# Patient Record
Sex: FEMALE | Race: WHITE | Employment: FULL TIME | URBAN - METROPOLITAN AREA
[De-identification: names, ages, dates, MRNs, and addresses within clinical notes are randomized per-mention and may not be internally consistent; named-entity substitution may affect disease eponyms.]

---

## 2020-10-05 ENCOUNTER — HOSPITAL ENCOUNTER (OUTPATIENT)
Dept: PHYSICAL THERAPY | Age: 25
Discharge: HOME | End: 2020-10-05
Payer: COMMERCIAL

## 2020-10-05 PROCEDURE — 97140 MANUAL THERAPY 1/> REGIONS: CPT | Performed by: PHYSICAL THERAPIST

## 2020-10-05 PROCEDURE — 97162 PT EVAL MOD COMPLEX 30 MIN: CPT | Performed by: PHYSICAL THERAPIST

## 2020-10-05 PROCEDURE — 97110 THERAPEUTIC EXERCISES: CPT | Performed by: PHYSICAL THERAPIST

## 2020-10-05 NOTE — PROGRESS NOTES
PHYSICAL THERAPY - DAILY TREATMENT NOTE    Patient Name: Purnima Charlton        Date: 10/5/2020  : 1995   YES Patient  Verified  Visit #:     Insurance: Payor: Earnest[employer] / Plan: Sebastián Sanchez / Product Type: Commerical /      In time: 9:00 Out time: 10:00   Total Treatment Time: 60     BCBS/Medicare Time Tracking (below)   Total Timed Codes (min):  na 1:1 Treatment Time:  na     TREATMENT AREA =  Left elbow pain [M25.522]    SUBJECTIVE  Pain Level (on 0 to 10 scale):  1  / 10   Medication Changes/New allergies or changes in medical history, any new surgeries or procedures?     NO    If yes, update Summary List   Subjective Functional Status/Changes:  [x]  No changes reported     See eval/POC           Modalities Rationale:     decrease edema, decrease inflammation and decrease pain to improve patient's ability to prevent soreness   min [] Estim, type/location:                                      []  att     []  unatt     []  w/US     []  w/ice    []  w/heat    min []  Mechanical Traction: type/lbs                   []  pro   []  sup   []  int   []  cont    []  before manual    []  after manual    min []  Ultrasound, settings/location:      min []  Iontophoresis w/ dexamethasone, location:                                               []  take home patch       []  in clinic   10 min [x]  Ice     []  Heat    location/position: L elbow - seated after session    min []  Vasopneumatic Device, press/temp:     min []  Other:    [x] Skin assessment post-treatment (if applicable):    [x]  intact    []  redness- no adverse reaction     []redness  adverse reaction:        15 min Therapeutic Exercise:  [x]  See flow sheet   Rationale:      increase ROM, increase strength and improve coordination to improve the patients ability to regain functional use of L elbow/wrist     10 min Manual Therapy: Caudal glide of L radius, AP/PA glides of radial head (Gr I/II) followed by gentle extension stretch in varying amounts of supination/pronation   Rationale:      decrease pain, increase ROM and increase tissue extensibility to improve patient's ability to regain functional reach into elbow extension      Billed With/As:   [] TE   [] TA   [] Neuro   [] Self Care Patient Education: [x] Review HEP    [] Progressed/Changed HEP based on:   [] positioning   [] body mechanics   [] transfers   [] heat/ice application    [] other:      Other Objective/Functional Measures:    FOTO - 61     Post Treatment Pain Level (on 0 to 10) scale:   1  / 10     ASSESSMENT  Assessment/Changes in Function:     Pt has pain, ROM loss, weakness, swelling, and limited functional use s/p L elbow arthroscopy     []  See Progress Note/Recertification   Patient will continue to benefit from skilled PT services to modify and progress therapeutic interventions, address functional mobility deficits, address ROM deficits, address strength deficits, analyze and address soft tissue restrictions, analyze and cue movement patterns, analyze and modify body mechanics/ergonomics, and assess and modify postural abnormalities to attain remaining goals.    Progress toward goals / Updated goals:    Goals established today     PLAN  [x]  Upgrade activities as tolerated YES Continue plan of care   []  Discharge due to :    []  Other:      Therapist: Jovanna Quintana PT    Date: 10/5/2020 Time: 8:55 AM     Future Appointments   Date Time Provider Li Luong   10/5/2020  9:00 AM Angie Arana PT [facility]

## 2020-10-05 NOTE — PROGRESS NOTES
[address] [facility] PHYSICAL THERAPY AT [address] [place] [address]  Seng [address], [address], [address]  Phone: [phone]  Fax: [phone] / [address] [address] PHYSICAL THERAPY SERVICES  Patient Name: Shaan Linda : 1995   Medical   Diagnosis: Left elbow pain [M25.522] Treatment Diagnosis: L Elbow Pain   Onset Date: 20     Referral Source: Nicolette Dubin, MD [facility]): 10/5/2020   Prior Hospitalization: See medical history Provider #: [id number]   Prior Level of Function: Pt had full, painfree ROM of L UE for all ADLs and working as a restaurant amanger   Comorbidities: Pt has 11 month old at home, prior surgery for partial hysterectomy   Medications: Verified on Patient Summary List   The Plan of Care and following information is based on the information from the initial evaluation.   ===========================================================================================  Assessment / key information:  32 y/o female presents to PT with c/o L elbow pain and stiffness since having arthroscopy on 20. Pt was originally injured when she slipped and fell in the kitchen at work [employer]) on 20. She notes being diagnosed with a L radial head fracture and did PT for about a month. Pt continued to work, but had recurrent episodes of pain and locking when moving into L elbow extension. She arthroscopic surgery on 20 to remove loose bodies and partial synovectomy. Pt has moved form [place] to [place], [place] since her surgery. Examination reveals PROM of L elbow to -16° from full extension (compared to 6° hyperextension on the R). She has near full motion with pronation and flexion, but has end range pain and stiff end feel. Strength testing revealed discomfort with bicep/supination contraction which limited strength.  strength was measured at 78lbs on R and 57lbs on L.   Wrist A/PROM was WNL and painfree. Ms Mikhail Melendez has pain, ROM loss, swelling, and limited functional use L (non-dominant) UE s/p L elbow arthroscopy.  ===========================================================================================  Eval Complexity: History MEDIUM  Complexity : 1-2 comorbidities / personal factors will impact the outcome/ POC ;  Examination  MEDIUM Complexity : 3 Standardized tests and measures addressing body structure, function, activity limitation and / or participation in recreation ; Presentation MEDIUM Complexity : Evolving with changing characteristics ; Decision Making MEDIUM Complexity : FOTO score of 26-74; Overall Complexity MEDIUM  Problem List: pain affecting function, decrease ROM, decrease strength, edema affecting function, decrease ADL/ functional abilitiies, decrease activity tolerance and decrease flexibility/ joint mobility   Treatment Plan may include any combination of the following: Therapeutic exercise, Therapeutic activities, Neuromuscular re-education, Physical agent/modality, Gait/balance training, Manual therapy, Dry Needling, Aquatic therapy, Patient education, Self Care training, Functional mobility training, Home safety training and Stair training  Patient / Family readiness to learn indicated by: asking questions, trying to perform skills and interest  Persons(s) to be included in education: patient (P)  Barriers to Learning/Limitations: None  Measures taken:    Patient Goal (s): \"full use of my arm\"   Patient self reported health status: fair  Rehabilitation Potential: good   Short Term Goals: To be accomplished in  2  weeks:  1. Pt independent with basic HEP for extension biased stretching and AROM. 2. Pt to achieve L elbow extension to 0° with self-stretching.  Long Term Goals: To be accomplished in  4  weeks:  1. Pt to increase FOTO score from 61 to >/= 70.  2. Pt independent with high level HEP for L elbow wrist flexibiity, strengthening/endurance exercises.   3. Pt to have full L elbow extension (slihgt recurvatum) without pain to allow full functional reaching. 4. Pt to return to working regular duty without restrictions. Frequency / Duration:   Patient to be seen  3  times per week for 4  weeks:  Patient / Caregiver education and instruction: self care, activity modification and exercises    Therapist Signature: Sindhu Burton PT Date: 71/3/4355   Certification Period: NA Time: 8:54 AM   ===========================================================================================  I certify that the above Physical Therapy Services are being furnished while the patient is under my care. I agree with the treatment plan and certify that this therapy is necessary. Physician Signature:        Date:       Time:     Please sign and return to [facility] or you may fax the signed copy to [phone]. Thank you.

## 2020-10-07 ENCOUNTER — HOSPITAL ENCOUNTER (OUTPATIENT)
Dept: PHYSICAL THERAPY | Age: 25
Discharge: HOME | End: 2020-10-07
Payer: COMMERCIAL

## 2020-10-07 PROCEDURE — 97110 THERAPEUTIC EXERCISES: CPT

## 2020-10-07 PROCEDURE — 97140 MANUAL THERAPY 1/> REGIONS: CPT

## 2020-10-07 NOTE — PROGRESS NOTES
PHYSICAL THERAPY - DAILY TREATMENT NOTE    Patient Name: Boston Bardales        Date: 10/7/2020  : 1995   YES Patient  Verified  Visit #:     Insurance: Payor: Radhapeter lay / Plan: [id number] [facility] / Product Type: Workers Comp /      In time: 600 Out time: 655   Total Treatment Time: 55     BCBS/Medicare Time Tracking (below)   Total Timed Codes (min):   1:1 Treatment Time:       TREATMENT AREA =  Left elbow pain [M25.522]    SUBJECTIVE  Pain Level (on 0 to 10 scale):  1  / 10   Medication Changes/New allergies or changes in medical history, any new surgeries or procedures?     NO    If yes, update Summary List   Subjective Functional Status/Changes:  []  No changes reported     I was sore after the eval but the ROM is much better and my elbow is straighter        Modalities Rationale:     decrease inflammation, decrease pain and increase tissue extensibility to improve patient's ability to perform ADLs   min [] Estim, type/location:                                      []  att     []  unatt     []  w/US     []  w/ice    []  w/heat    min []  Mechanical Traction: type/lbs                   []  pro   []  sup   []  int   []  cont    []  before manual    []  after manual    min []  Ultrasound, settings/location:      min []  Iontophoresis w/ dexamethasone, location:                                               []  take home patch       []  in clinic    min []  Ice     []  Heat    location/position:     min []  Vasopneumatic Device, press/temp:     min []  Other:    [x] Skin assessment post-treatment (if applicable):    [x]  intact    [x]  redness- no adverse reaction     []redness  adverse reaction:        35 min Therapeutic Exercise:  [x]  See flow sheet   Rationale:      increase ROM and increase strength to improve the patients ability to perform unlimted ADLs     10 min Manual Therapy: Technique:      [] S/DTM []IASTM []PROM  [] Passive Stretching  []manual TPR  [x]Jt manipulation:Gr I [] II [x]  III [x] IV[x] V[]  Treatment/Area:  Radial head PA glides, caudal glide of radius, PROM into supination>ext   Rationale:      decrease pain, increase ROM, increase tissue extensibility and decrease trigger points to improve patient's ability to perform L UE ADLs    Billed With/As:   [] TE   [] TA   [] Neuro   [] Self Care Patient Education: [x] Review HEP    [] Progressed/Changed HEP based on:   [] positioning   [] body mechanics   [] transfers   [] heat/ice application    [] other:      Other Objective/Functional Measures:    TE per FS  Eccentric weakness noted of triceps, wrist flexors and extensors with visible mm juddering during eccentric phase of PRE     Post Treatment Pain Level (on 0 to 10) scale:   1  / 10     ASSESSMENT  Assessment/Changes in Function:     Increased ROM into all planes after MT. Cont to have ERP into extension and unable to tolerance full elbow extension during wrist extensor stretch     []  See Progress Note/Recertification   Patient will continue to benefit from skilled PT services to modify and progress therapeutic interventions, address functional mobility deficits, address ROM deficits, address strength deficits, analyze and address soft tissue restrictions, analyze and cue movement patterns, analyze and modify body mechanics/ergonomics, assess and modify postural abnormalities, address imbalance/dizziness and instruct in home and community integration to attain remaining goals.    Progress toward goals / Updated goals:    Progressing towards ROM goals     PLAN  [x]  Upgrade activities as tolerated YES Continue plan of care   []  Discharge due to :    []  Other:      Therapist: Gabino Shane, PT, DPT, MTC, CMTPT    Date: 10/7/2020 Time: 6:20 PM     Future Appointments   Date Time Provider Li Luong   10/8/2020  6:00 PM Naima Perez [facility]   10/13/2020  9:00 AM Shamar Mott PT [facility]   10/15/2020 12:00 PM Ivonne Sherwood V, PT [facility]   10/16/2020  9:00 AM Norma Hair, PT [facility]   10/20/2020  9:00 AM Norma Hair, PT [facility]   10/22/2020  9:45 AM Nilesh Mo, PT [facility]   10/23/2020  9:00 AM Norma Hair, PT [facility]   10/26/2020  9:00 AM Norma Hair, PT [facility]   10/28/2020  7:30 AM Sandra GAMA [facility]   10/30/2020  9:00 AM Dayanara Sherwood, PT [facility]

## 2020-10-08 ENCOUNTER — HOSPITAL ENCOUNTER (OUTPATIENT)
Dept: PHYSICAL THERAPY | Age: 25
Discharge: HOME | End: 2020-10-08
Payer: COMMERCIAL

## 2020-10-08 PROCEDURE — 97110 THERAPEUTIC EXERCISES: CPT

## 2020-10-08 PROCEDURE — 97140 MANUAL THERAPY 1/> REGIONS: CPT

## 2020-10-08 NOTE — PROGRESS NOTES
PHYSICAL THERAPY - DAILY TREATMENT NOTE    Patient Name: Trey Sena        Date: 10/8/2020  : 1995   YES Patient  Verified  Visit #:   3   of   12  Insurance: Payor: Rhinara Heater / Plan: [id number] [facility] / Product Type: Workers Comp /      In time: 605 Out time: 700   Total Treatment Time: 54     BCBS/Medicare Time Tracking (below)   Total Timed Codes (min):  NA 1:1 Treatment Time:  NA     TREATMENT AREA =  Left elbow pain [M25.522]    SUBJECTIVE  Pain Level (on 0 to 10 scale):  0  / 10   Medication Changes/New allergies or changes in medical history, any new surgeries or procedures? NO    If yes, update Summary List   Subjective Functional Status/Changes:  []  No changes reported      PAtient reports that her elbow is feeling so much even since her first visit. She reports that she has been icing it regularly and that has helped.              Modalities Rationale:     decrease inflammation and decrease pain to improve patient's ability to perform pain free ADLs    min [] Estim, type/location:                                      []  att     []  unatt     []  w/US     []  w/ice    []  w/heat    min []  Mechanical Traction: type/lbs                   []  pro   []  sup   []  int   []  cont    []  before manual    []  after manual    min []  Ultrasound, settings/location:      min []  Iontophoresis w/ dexamethasone, location:                                               []  take home patch       []  in clinic   10 min [x]  Ice     []  Heat    location/position: L elbow in sitting    min []  Vasopneumatic Device, press/temp:     min []  Other:    [x] Skin assessment post-treatment (if applicable):    [x]  intact    [x]  redness- no adverse reaction     []redness  adverse reaction:        35 min Therapeutic Exercise:  [x]  See flow sheet   Rationale:      increase ROM, increase strength, improve coordination, improve balance and increase proprioception to improve the patients ability to perform unlimited ADLs      10 min Manual Therapy: Gentle PROM with end range stretching into elbow extension and supination. PA mobilizations o radial head with arm extended. Rationale:      decrease pain, increase ROM, increase tissue extensibility, decrease edema , correct positional vertigo, decrease trigger points and increase postural awareness to improve patient's ability to perform ,pain free dressing      Billed With/As:   [] TE   [] TA   [] Neuro   [] Self Care Patient Education: [x] Review HEP    [] Progressed/Changed HEP based on:   [] positioning   [] body mechanics   [] transfers   [] heat/ice application    [] other:      Other Objective/Functional Measures:    Patient able to achieve near full extension. Post Treatment Pain Level (on 0 to 10) scale:   0  / 10     ASSESSMENT  Assessment/Changes in Function:     Patient is making good progress with PT rx with good tolerance to all PREs but quick to fatigue. []  See Progress Note/Recertification   Patient will continue to benefit from skilled PT services to modify and progress therapeutic interventions, address functional mobility deficits, address ROM deficits, address strength deficits, analyze and address soft tissue restrictions, analyze and cue movement patterns, analyze and modify body mechanics/ergonomics, assess and modify postural abnormalities, address imbalance/dizziness and instruct in home and community integration to attain remaining goals. Progress toward goals / Updated goals:    Progressing towards LTG 2.       PLAN  [x]  Upgrade activities as tolerated YES Continue plan of care   []  Discharge due to :    []  Other:      Therapist: Kory Vásquez    Date: 10/8/2020 Time: 6:34 PM     Future Appointments   Date Time Provider Li Luong   10/13/2020  9:00 AM Samreen Ferreira PT [facility] [place] [facility]   10/15/2020 12:00 PM [facility]   10/16/2020  9:00 AM [facility], [facility]   10/20/2020  9:00 AM Yaya Dunn, PT [facility] [place] [facility]   10/22/2020  9:45 AM Helio Enrique, PT [facility]   10/23/2020  9:00 AM Yaya Dunn, PT [facility]   10/26/2020  9:00 AM Yaya Dunn, PT [facility]   10/28/2020  7:30 AM Neda GAMA [facility]   10/30/2020  9:00 AM Nazia Sherwood, PT [facility]

## 2020-10-13 ENCOUNTER — HOSPITAL ENCOUNTER (OUTPATIENT)
Dept: PHYSICAL THERAPY | Age: 25
Discharge: HOME | End: 2020-10-13
Payer: COMMERCIAL

## 2020-10-13 PROCEDURE — 97140 MANUAL THERAPY 1/> REGIONS: CPT

## 2020-10-13 PROCEDURE — 97110 THERAPEUTIC EXERCISES: CPT

## 2020-10-13 NOTE — PROGRESS NOTES
PHYSICAL THERAPY - DAILY TREATMENT NOTE    Patient Name: Levar Jackson        Date: 10/13/2020  : 1995   YES Patient  Verified  Visit #:     Insurance: Payor: David Weems / Plan: [id number] [facility] / Product Type: Workers Comp /      In time: 945 Out time: 1045   Total Treatment Time: 60     BCBS/Medicare Time Tracking (below)   Total Timed Codes (min):  50 1:1 Treatment Time:  50     TREATMENT AREA =  Left elbow pain [M25.522]    SUBJECTIVE  Pain Level (on 0 to 10 scale):  0  / 10   Medication Changes/New allergies or changes in medical history, any new surgeries or procedures?     NO    If yes, update Summary List   Subjective Functional Status/Changes:  []  No changes reported     Patient reports that her elbow is feeling pretty good           Modalities Rationale:     decrease inflammation and decrease pain to improve patient's ability to perform pain free ADLs    min [] Estim, type/location:                                      []  att     []  unatt     []  w/US     []  w/ice    []  w/heat    min []  Mechanical Traction: type/lbs                   []  pro   []  sup   []  int   []  cont    []  before manual    []  after manual    min []  Ultrasound, settings/location:      min []  Iontophoresis w/ dexamethasone, location:                                               []  take home patch       []  in clinic   10 min [x]  Ice     []  Heat    location/position: L elbow in sitting    min []  Vasopneumatic Device, press/temp:     min []  Other:    [x] Skin assessment post-treatment (if applicable):    [x]  intact    [x]  redness- no adverse reaction     []redness  adverse reaction:        40 min Therapeutic Exercise:  [x]  See flow sheet   Rationale:      increase ROM, increase strength, improve coordination, improve balance and increase proprioception to improve the patients ability to perform pain free ADLs      10 min Manual Therapy: PROM of the L elbow with flexion/extension with supination and pronation and radial glides at the wrist into supination and pronation   Rationale:      decrease pain, increase ROM and increase tissue extensibility to improve patient's ability to perform pain free ADLs         Billed With/As:   [] TE   [] TA   [] Neuro   [] Self Care Patient Education: [x] Review HEP    [] Progressed/Changed HEP based on:   [] positioning   [] body mechanics   [] transfers   [] heat/ice application    [] other:      Other Objective/Functional Measures:    Improved tolerance to active supination and pronation after manual therapy. Post Treatment Pain Level (on 0 to 10) scale:   0  / 10     ASSESSMENT  Assessment/Changes in Function:     Patient is making good progress with PT rx with steady gains in overall strength and range of motion. []  See Progress Note/Recertification   Patient will continue to benefit from skilled PT services to modify and progress therapeutic interventions, address functional mobility deficits, address ROM deficits, address strength deficits, analyze and cue movement patterns, analyze and modify body mechanics/ergonomics and assess and modify postural abnormalities to attain remaining goals. Progress toward goals / Updated goals:    Progressing towards LTG 2.       PLAN  [x]  Upgrade activities as tolerated YES Continue plan of care   []  Discharge due to :    []  Other:      Therapist: Macarena Gamez    Date: 10/13/2020 Time: 12:01 PM     Future Appointments   Date Time Provider Li Luong   10/15/2020 12:00 PM Derek Gupta, PT [facility]   10/16/2020  9:00 AM Lisseth Quinn PT [facility]   10/20/2020  9:00 AM Lisseth Quinn PT [facility] [place] [facility]   10/22/2020  9:45 AM Shanda Swanson PT [facility]   10/23/2020  9:00 AM Lisseth Quinn PT [facility]   10/26/2020  9:00 AM Lisseth Quinn, PT [facility]   10/28/2020  7:30 AM Rubia GAMA [facility]   10/30/2020  9:00 AM [facility]ino [facility]wood, [facility]

## 2020-10-15 ENCOUNTER — HOSPITAL ENCOUNTER (OUTPATIENT)
Dept: PHYSICAL THERAPY | Age: 25
Discharge: HOME | End: 2020-10-15
Payer: COMMERCIAL

## 2020-10-15 PROCEDURE — 97110 THERAPEUTIC EXERCISES: CPT

## 2020-10-15 PROCEDURE — 97140 MANUAL THERAPY 1/> REGIONS: CPT

## 2020-10-15 NOTE — PROGRESS NOTES
PHYSICAL THERAPY - DAILY TREATMENT NOTE    Patient Name: Andrey Noel        Date: 10/15/2020  : 1995   YES Patient  Verified  Visit #:      of   12  Insurance: Payor: Hitesh Hand / Plan: [id number] / Product Type: Workers Comp /      In time: 12:05 P Out time: 1:05 P   Total Treatment Time: 60     BCBS/Medicare Time Tracking (below)   Total Timed Codes (min):  NA 1:1 Treatment Time:  NA     TREATMENT AREA =  Left elbow pain [M25.522]    SUBJECTIVE  Pain Level (on 0 to 10 scale):  0  / 10   Medication Changes/New allergies or changes in medical history, any new surgeries or procedures? NO    If yes, update Summary List   Subjective Functional Status/Changes:  []  No changes reported     Patient reports no new complaints since last treatment, her motion is getting better but her swelling on the side of her elbow remains the same. Her  just received her medical records & they are setting her up with an orthopedic doctor in [facility].          Modalities Rationale:     decrease edema, decrease inflammation and decrease pain to improve patient's ability to return to pain-free lifiting    min [] Estim, type/location:                                      []  att     []  unatt     []  w/US     []  w/ice    []  w/heat    min []  Mechanical Traction: type/lbs                   []  pro   []  sup   []  int   []  cont    []  before manual    []  after manual    min []  Ultrasound, settings/location:      min []  Iontophoresis w/ dexamethasone, location:                                               []  take home patch       []  in clinic   10 min [x]  Ice     []  Heat    location/position: Supine to L elbow    min []  Vasopneumatic Device, press/temp:     min []  Other:    [] Skin assessment post-treatment (if applicable):    [x]  intact    [x]  redness- no adverse reaction     []redness  adverse reaction:        40 min Therapeutic Exercise:  [x]  See flow sheet Rationale:      increase ROM and increase strength to improve the patients ability to return to pain-free lifting using L UE     10 min Manual Therapy: Gentle passive stretch for ext into supination/pronation, AP mob to radial head   Rationale:      decrease pain, increase ROM and increase tissue extensibility to improve patient's ability to return to full duty once she resumes work     Billed With/As:   [] TE   [] TA   [] Neuro   [] Self Care Patient Education: [x] Review HEP    [] Progressed/Changed HEP based on:   [] positioning   [] body mechanics   [] transfers   [] heat/ice application    [] other:      Other Objective/Functional Measures:    Gross  strength 59# on L  Elbow ext 5 degrees from full ext      Post Treatment Pain Level (on 0 to 10) scale:   0  / 10     ASSESSMENT  Assessment/Changes in Function:     Good tolerance to today's treatment, mild swelling on medial elbow region, improved tolerance to static elbow extension stretch      []  See Progress Note/Recertification   Patient will continue to benefit from skilled PT services to modify and progress therapeutic interventions, address functional mobility deficits, address ROM deficits, address strength deficits, analyze and address soft tissue restrictions, analyze and cue movement patterns, assess and modify postural abnormalities and instruct in home and community integration to attain remaining goals.    Progress toward goals / Updated goals:    Progressing towards STG 2      PLAN  []  Upgrade activities as tolerated YES Continue plan of care   []  Discharge due to :    []  Other:      Therapist: Ash Luong PT    Date: 10/15/2020 Time: 12:15 PM     Future Appointments   Date Time Provider Li Luong   10/16/2020  9:00 AM Saeed Weiner PT [facility]   10/20/2020  9:00 AM Saeed Weiner PT [facility]   10/22/2020  9:45 AM [facility]   10/23/2020  9:00 AM HOLLY Díaz [facility]   10/26/2020  9:00 AM Biju Amato, PT [facility]   10/28/2020  7:30 AM Chad GAMA [facility]   10/30/2020  9:00 AM Rena Sherwood, PT [facility]

## 2020-10-23 ENCOUNTER — APPOINTMENT (OUTPATIENT)
Dept: PHYSICAL THERAPY | Age: 25
End: 2020-10-23
Payer: COMMERCIAL

## 2020-10-26 ENCOUNTER — APPOINTMENT (OUTPATIENT)
Dept: PHYSICAL THERAPY | Age: 25
End: 2020-10-26
Payer: COMMERCIAL

## 2020-10-28 ENCOUNTER — APPOINTMENT (OUTPATIENT)
Dept: PHYSICAL THERAPY | Age: 25
End: 2020-10-28
Payer: COMMERCIAL

## 2020-10-30 ENCOUNTER — APPOINTMENT (OUTPATIENT)
Dept: PHYSICAL THERAPY | Age: 25
End: 2020-10-30
Payer: COMMERCIAL

## 2021-01-21 NOTE — PROGRESS NOTES
[address] [facility] PHYSICAL THERAPY AT [address] [place] [address]  Seng Hoang [facility], [address] [place] [id number], [address]  Phone: [phone]  Fax: [phone] SUMMARY  Patient Name: Jasper Sims : 1995   Treatment/Medical Diagnosis: Left elbow pain [M25.522]   Referral Source: Yvonna Duverney, MD     Date of Initial Visit: 10-15-20 Attended Visits: 5 Missed Visits: 3     SUMMARY OF TREATMENT  Therapeutic exercise including ROM, stretching, gentle strengthening, manual therapy including passive stretching & joint mobilizations, postural ed, patient education, HEP instruction, CP. CURRENT STATUS  Ms. Lonnie Melton was seen for 4 f/u treatments, she reported 0/10 pain in L elbow & had reported decreased c/o pain, improved ROM but minimal change in swelling since starting PT. Improved gross  strength to 59# on L. She was otherwise unable to formally re-assessed prior to DC. She no showed for 3 consecutive scheduled visits (10/16, 10/20, 10/22) & called to notify patient of DC to home program on 10/22/20 due to poor attendance with PT. Goal/Measure of Progress Goal Met? 1.  Pt independent with basic HEP for extension biased stretching and AROM. Status at last Eval: NA Current Status: Unable to assess compliance with HEP n/a   2. Pt to achieve L elbow extension to 0° with self-stretching. Status at last Eval: L elbow lacking 16 degrees from full extension  Current Status: L elbow lacking 5 degrees from full extension  Partially met    3. Pt to increase FOTO score from 61 to >/= 70. Status at last Eval: 64 Current Status: Unable to be re-assessed n/a     RECOMMENDATIONS  Other: DC to home program    If you have any questions/comments please contact us directly at [phone]. Thank you for allowing us to assist in the care of your patient.     Therapist Signature: JOSE CARLOS Claros, cert MDT Date: 3-30-64     Time: 4:56 PM